# Patient Record
Sex: FEMALE | Race: WHITE
[De-identification: names, ages, dates, MRNs, and addresses within clinical notes are randomized per-mention and may not be internally consistent; named-entity substitution may affect disease eponyms.]

---

## 2017-06-10 ENCOUNTER — HOSPITAL ENCOUNTER (OUTPATIENT)
Dept: HOSPITAL 17 - PHED | Age: 52
Setting detail: OBSERVATION
LOS: 1 days | Discharge: HOME | End: 2017-06-11
Attending: HOSPITALIST | Admitting: HOSPITALIST
Payer: COMMERCIAL

## 2017-06-10 VITALS — DIASTOLIC BLOOD PRESSURE: 87 MMHG | SYSTOLIC BLOOD PRESSURE: 138 MMHG | HEART RATE: 78 BPM

## 2017-06-10 VITALS
RESPIRATION RATE: 16 BRPM | HEART RATE: 85 BPM | OXYGEN SATURATION: 99 % | SYSTOLIC BLOOD PRESSURE: 154 MMHG | DIASTOLIC BLOOD PRESSURE: 88 MMHG

## 2017-06-10 VITALS — RESPIRATION RATE: 16 BRPM | SYSTOLIC BLOOD PRESSURE: 158 MMHG | DIASTOLIC BLOOD PRESSURE: 63 MMHG | HEART RATE: 89 BPM

## 2017-06-10 VITALS — HEART RATE: 87 BPM | DIASTOLIC BLOOD PRESSURE: 73 MMHG | SYSTOLIC BLOOD PRESSURE: 147 MMHG

## 2017-06-10 VITALS — BODY MASS INDEX: 28.97 KG/M2 | WEIGHT: 157.41 LBS | HEIGHT: 62 IN

## 2017-06-10 VITALS — SYSTOLIC BLOOD PRESSURE: 167 MMHG | DIASTOLIC BLOOD PRESSURE: 85 MMHG | HEART RATE: 88 BPM

## 2017-06-10 VITALS
OXYGEN SATURATION: 99 % | HEART RATE: 74 BPM | SYSTOLIC BLOOD PRESSURE: 156 MMHG | DIASTOLIC BLOOD PRESSURE: 77 MMHG | RESPIRATION RATE: 16 BRPM

## 2017-06-10 VITALS
SYSTOLIC BLOOD PRESSURE: 144 MMHG | DIASTOLIC BLOOD PRESSURE: 86 MMHG | HEART RATE: 78 BPM | OXYGEN SATURATION: 98 % | RESPIRATION RATE: 16 BRPM

## 2017-06-10 VITALS
OXYGEN SATURATION: 99 % | RESPIRATION RATE: 16 BRPM | DIASTOLIC BLOOD PRESSURE: 94 MMHG | SYSTOLIC BLOOD PRESSURE: 189 MMHG | TEMPERATURE: 98.5 F | HEART RATE: 79 BPM

## 2017-06-10 VITALS — SYSTOLIC BLOOD PRESSURE: 152 MMHG | HEART RATE: 88 BPM | DIASTOLIC BLOOD PRESSURE: 89 MMHG

## 2017-06-10 DIAGNOSIS — R51: ICD-10-CM

## 2017-06-10 DIAGNOSIS — E27.9: ICD-10-CM

## 2017-06-10 DIAGNOSIS — Z88.5: ICD-10-CM

## 2017-06-10 DIAGNOSIS — Z88.2: ICD-10-CM

## 2017-06-10 DIAGNOSIS — R07.89: Primary | ICD-10-CM

## 2017-06-10 DIAGNOSIS — Z82.49: ICD-10-CM

## 2017-06-10 DIAGNOSIS — E89.0: ICD-10-CM

## 2017-06-10 DIAGNOSIS — E78.5: ICD-10-CM

## 2017-06-10 DIAGNOSIS — Z72.0: ICD-10-CM

## 2017-06-10 DIAGNOSIS — Z88.1: ICD-10-CM

## 2017-06-10 DIAGNOSIS — E78.00: ICD-10-CM

## 2017-06-10 DIAGNOSIS — I48.91: ICD-10-CM

## 2017-06-10 DIAGNOSIS — I10: ICD-10-CM

## 2017-06-10 LAB
ANION GAP SERPL CALC-SCNC: 9 MEQ/L (ref 5–15)
APTT BLD: 29.7 SEC (ref 24.3–30.1)
BASOPHILS # BLD AUTO: 0.1 TH/MM3 (ref 0–0.2)
BASOPHILS NFR BLD: 1.1 % (ref 0–2)
BUN SERPL-MCNC: 23 MG/DL (ref 7–18)
CHLORIDE SERPL-SCNC: 108 MEQ/L (ref 98–107)
CK SERPL-CCNC: 79 U/L (ref 26–192)
EOSINOPHIL # BLD: 0.2 TH/MM3 (ref 0–0.4)
EOSINOPHIL NFR BLD: 3.6 % (ref 0–4)
ERYTHROCYTE [DISTWIDTH] IN BLOOD BY AUTOMATED COUNT: 12.9 % (ref 11.6–17.2)
GFR SERPLBLD BASED ON 1.73 SQ M-ARVRAT: 58 ML/MIN (ref 89–?)
HCO3 BLD-SCNC: 28.4 MEQ/L (ref 21–32)
HCT VFR BLD CALC: 35.7 % (ref 35–46)
HEMO FLAGS: (no result)
INR PPP: 0.9 RATIO
LYMPHOCYTES # BLD AUTO: 1.9 TH/MM3 (ref 1–4.8)
LYMPHOCYTES NFR BLD AUTO: 29.7 % (ref 9–44)
MAGNESIUM SERPL-MCNC: 2.1 MG/DL (ref 1.5–2.5)
MCH RBC QN AUTO: 31.1 PG (ref 27–34)
MCHC RBC AUTO-ENTMCNC: 35 % (ref 32–36)
MCV RBC AUTO: 88.7 FL (ref 80–100)
MONOCYTES NFR BLD: 5.2 % (ref 0–8)
NEUTROPHILS # BLD AUTO: 4 TH/MM3 (ref 1.8–7.7)
NEUTROPHILS NFR BLD AUTO: 60.4 % (ref 16–70)
PLATELET # BLD: 120 TH/MM3 (ref 150–450)
POTASSIUM SERPL-SCNC: 3.8 MEQ/L (ref 3.5–5.1)
PROTHROMBIN TIME: 10.2 SEC (ref 9.8–11.6)
RBC # BLD AUTO: 4.02 MIL/MM3 (ref 4–5.3)
SODIUM SERPL-SCNC: 145 MEQ/L (ref 136–145)
WBC # BLD AUTO: 6.5 TH/MM3 (ref 4–11)

## 2017-06-10 PROCEDURE — 93017 CV STRESS TEST TRACING ONLY: CPT

## 2017-06-10 PROCEDURE — 82550 ASSAY OF CK (CPK): CPT

## 2017-06-10 PROCEDURE — G0378 HOSPITAL OBSERVATION PER HR: HCPCS

## 2017-06-10 PROCEDURE — 83690 ASSAY OF LIPASE: CPT

## 2017-06-10 PROCEDURE — 71275 CT ANGIOGRAPHY CHEST: CPT

## 2017-06-10 PROCEDURE — 71010: CPT

## 2017-06-10 PROCEDURE — 84484 ASSAY OF TROPONIN QUANT: CPT

## 2017-06-10 PROCEDURE — 74174 CTA ABD&PLVS W/CONTRAST: CPT

## 2017-06-10 PROCEDURE — 70450 CT HEAD/BRAIN W/O DYE: CPT

## 2017-06-10 PROCEDURE — 78452 HT MUSCLE IMAGE SPECT MULT: CPT

## 2017-06-10 PROCEDURE — 85610 PROTHROMBIN TIME: CPT

## 2017-06-10 PROCEDURE — 85025 COMPLETE CBC W/AUTO DIFF WBC: CPT

## 2017-06-10 PROCEDURE — 85730 THROMBOPLASTIN TIME PARTIAL: CPT

## 2017-06-10 PROCEDURE — 80048 BASIC METABOLIC PNL TOTAL CA: CPT

## 2017-06-10 PROCEDURE — 83735 ASSAY OF MAGNESIUM: CPT

## 2017-06-10 PROCEDURE — 99285 EMERGENCY DEPT VISIT HI MDM: CPT

## 2017-06-10 PROCEDURE — A9502 TC99M TETROFOSMIN: HCPCS

## 2017-06-10 PROCEDURE — 96374 THER/PROPH/DIAG INJ IV PUSH: CPT

## 2017-06-10 PROCEDURE — 93005 ELECTROCARDIOGRAM TRACING: CPT

## 2017-06-10 RX ADMIN — NITROGLYCERIN SCH MG: 0.4 TABLET SUBLINGUAL at 21:30

## 2017-06-10 RX ADMIN — NITROGLYCERIN SCH MG: 0.4 TABLET SUBLINGUAL at 21:22

## 2017-06-10 RX ADMIN — NITROGLYCERIN SCH MG: 0.4 TABLET SUBLINGUAL at 21:10

## 2017-06-10 RX ADMIN — PHENYTOIN SODIUM SCH MLS/HR: 50 INJECTION INTRAMUSCULAR; INTRAVENOUS at 21:21

## 2017-06-10 NOTE — RADHPO
EXAM DATE/TIME:  06/10/2017 23:09 

 

[facility] COMPARISON:     

No previous studies available for comparison.

 

 

INDICATIONS :     

Chest pain.  Short of breath. 

                           

 

IV CONTRAST:     

85 cc Omnipaque 350 (iohexol) IV 

 

 

RADIATION DOSE:     

19.02 CTDIvol (mGy) 

 

 

MEDICAL HISTORY :     

Emphysema. Diverticulitis. Hernia, hiatal. Hypertension.

 

SURGICAL HISTORY :      

None. 

 

ENCOUNTER:      

Initial

 

ACUITY:      

1 week

 

PAIN SCALE:      

8/10

 

LOCATION:        

chest 

 

TECHNIQUE:     

Volumetric scanning was performed using a multi-row detector CT scanner.  The data was post processed
 with a variety of visualization algorithms including full volume maximum intensity projection, multi
-planar sliding thin slab reformation, curved planar reformation, and surface rendering techniques.  
Using automated exposure control and adjustment of the mA and/or kV according to patient size, radiat
ion dose was kept as low as reasonably achievable to obtain optimal diagnostic quality images. 

 

FINDINGS:     

 

LUNGS:     

There is no consolidation or pneumothorax.  No concerning pulmonary nodule is visualized.  No pleural
 fluid is present.

 

MEDIASTINUM:     

No abnormally enlarged lymph nodes by CT criteria. No axillary or hilar abnormalities are identified.
 

 

ABDOMEN:     

The liver and spleen are free of focal defects. The gallbladder and pancreas demonstrate no abnormali
ty. The left adrenal gland is unremarkable. There is a 1.7 x 1.5 cm right adrenal mass most character
istic of an adenoma. The kidneys demonstrate no evidence of solid renal mass or hydronephrosis. No fr
ee fluid or abdominal masses are identified. No para-aortic adenopathy is seen.

 

PELVIS:     

No evidence of free fluid or pelvic mass. No abnormally enlarged inguinal or retroperitoneal lymph no
migue are present. The bladder is unremarkable.

 

THORACIC AORTA:       

The thoracic aortic root is normal with normal branching of the great vessels.  There is no evidence 
of aneurysm or dissection. 

 

ABDOMINAL AORTA:     

The aorta is normal in caliber without aneurysm or dissection.  The renal arteries are patent bilater
ally.  The proximal celiac and superior mesenteric arteries are patent and normal in diameter.   

 

PELVIC VESSELS:     

The internal iliac and external iliac vessels are patent without aneurysm or stenosis.

 

CONCLUSION:     

1. Small right adrenal mass most characteristic of an adenoma.

2. The thoracic aorta is unremarkable with no aneurysm or dissection.

 

 

 

 Liam Leyva MD on Nadia 10, 2017 at 23:48           

Board Certified Radiologist.

 This report was verified electronically.

## 2017-06-10 NOTE — PD
HPI


Chief Complaint:  Chest Pain


Time Seen by Provider:  20:58


Travel History


International Travel<30 days:  No


Contact w/Intl Traveler<30days:  No


Traveled to known affect area:  No





History of Present Illness


HPI


51-year-old female presents to the emergency department for complaint of chest 

pain.  Patient states she's had chest pain for the past several hours with 

radiation into her back associated shortness of breath nausea but no 

diaphoresis.  Patient states that she has been having symptoms like this over 

the past week and on Thursday she had an episode of lightheadedness and 

dizziness.  Patient also complains of headache and dizziness today.  Symptoms 

not sudden onset or thunderclap or worst ever.  Patient denies any visual 

disturbance.  No change in mentation no change in speech.  Patient states chest 

pain is 8/10 in intensity.  Patient denies abdominal pain.  No report of 

hematemesis coffee-ground emesis bilious emesis melena or hematochezia.  

Patient states she took 3 ibuprofen prior to arrival to the emergency 

department for did not take any aspirin.  Patient does have history of 

hypertension for which she is prescribed metoprolol as well as dyslipidemia and 

hypothyroidism.  Patient states 2012 she reportedly had a 'heart attack' that 

was diagnosed as pericarditis; did not undergo cardiac catheterization during 

her hospitalization or subsequently; and again was diagnosed with pericarditis.

  Patient does not have sublingual nitroglycerin to take on an as-needed basis.

  Patient denies any injury but had a near fall with dizziness and a "head rush

" while bathing her 4-year-old granddaughter on Thursday evening.  Patient 

states she did not suffer any injury.  She reports she was able to drive 

herself here to the hospital this evening.  Patient's primary is Dr. Walsh and 

her cardiologist is Dr. Castro.  Patient was a previous tobacco smoker.  Patient 

has family history of CAD.  Patient denies diabetes.





[facility]


Past Medical History


*** Narrative Medical


Hypertension atrial fibrillation pericarditis dyslipidemia chest pain 

diverticulitis vertigo hypothyroidism; prior tobacco use; family history CAD; 

nursing notes reviewed


Hx Anticoagulant Therapy:  No


Asthma:  No


Heart Rhythm Problems:  No


Cancer:  No


Cardiovascular Problems:  Yes (HTN, AFIB)


High Cholesterol:  Yes


Chemotherapy:  No


Chest Pain:  Yes


Congestive Heart Failure:  No


COPD:  No


Cerebrovascular Accident:  No


Diabetes:  No


Diverticulitis:  Yes


Endocrine:  Yes (HYPERTHYROIDISM)


Gastrointestinal Disorders:  Yes


GERD:  No


Genitourinary:  No


Hiatal Hernia:  Yes


Hypertension:  Yes


Immune Disorder:  No


Implanted Vascular Access Dvce:  No


Kidney Stones:  No


Musculoskeletal:  No


Neurologic:  No


Psychiatric:  No


Reproductive:  No


Respiratory:  No


Renal Failure:  No


Sleep Apnea:  No


Thyroid Disease:  Yes (THYROID ABLASION)


Ulcer:  No


Menopausal:  No





Past Surgical History


Abdominal Surgery:  No


Cardiac Surgery:  No


Ear Surgery:  No


Endocrine Surgery:  Yes (THYROID ABLASION)


Eye Surgery:  No


Genitourinary Surgery:  No


Gynecologic Surgery:  No


Hysterectomy:  No


Neurologic Surgery:  No


Oral Surgery:  No


Thoracic Surgery:  No


Other Surgery:  Yes ("thyroid burned off")





Social History


Alcohol Use:  Yes (rarely)


Tobacco Use:  Yes (quit 1 week ago)


Substance Use:  No





Allergies-Medications


(Allergen,Severity, Reaction):  


Coded Allergies:  


     Amoxicillin (Verified  Allergy, Severe, nausea, 6/10/17)


     Codeine (Verified  Allergy, Severe, nausea, 6/10/17)


     Sulfa (Verified  Allergy, Severe, hives, 6/10/17)


Reported Meds & Prescriptions





Reported Meds & Active Scripts


Active


Reported


Synthroid (Levothyroxine Sodium) 125 Mcg Tab 125 Mcg PO DAILY


Metoprolol Tartrate 25 Mg Tab 25 Mg PO BID


Lovastatin 20 Mg Tab 20 Mg PO DAILY








Review of Systems


Except as stated in HPI:  all other systems reviewed are Neg


General / Constitutional:  No: Fever, Chills


Eyes:  No: Diploplia, Blurred Vision


HENT:  Positive: Headaches,  No: Neck Stiffness, Neck Pain


Cardiovascular:  Positive: Chest Pain or Discomfort


Respiratory:  Positive: Shortness of Breath


Gastrointestinal:  Positive: Nausea,  No: Abdominal Pain


Genitourinary:  No: Flank Pain


Musculoskeletal:  No: Myalgias, Arthralgias, Pain


Skin:  No Rash


Neurologic:  Positive: Weakness, Dizziness, Headache,  No: Syncope, Focal 

Abnormalities, Coordination Problem, Tremor, Ataxia, Change in Mentation, 

Slurred Speech, Paresthesia, Incontinence, Seizures, Sensory Disturbance


Psychiatric:  Positive: Anxiety


Hematologic/Lymphatic:  No: Lymph Node Enlargement





Physical Exam


Narrative


GENERAL: Well-developed well-nourished female in acute distress no respiratory 

distress.


SKIN: Warm and dry.


HEAD: Atraumatic. Normocephalic. 


EYES: Pupils equal and round. No scleral icterus. No injection or drainage. 


ENT: No nasal bleeding or discharge.  Mucous membranes pink and moist.


NECK: Trachea midline. No JVD. 


CARDIOVASCULAR: Regular rate and rhythm.  Radial and dorsalis pedis pulses 2+ 

to palpation bilaterally.


RESPIRATORY: No accessory muscle use. Clear to auscultation. Breath sounds 

equal bilaterally. 


GASTROINTESTINAL: Abdomen soft, non-tender, nondistended. Hepatic and splenic 

margins not palpable. 


MUSCULOSKELETAL: Extremities without clubbing, cyanosis, or edema. No obvious 

deformities. 


NEUROLOGICAL: Awake and alert. No obvious cranial nerve deficits.  Motor 

grossly within normal limits. Five out of 5 muscle strength in the arms and 

legs.  Normal speech.


PSYCHIATRIC: Appropriate mood and affect; insight and judgment normal.





Data


Data


Last Documented VS





Vital Signs








  Date Time  Temp Pulse Resp B/P Pulse Ox O2 Delivery O2 Flow Rate FiO2


 


6/10/17 23:45  78 16 144/86 98 Room Air  


 


6/10/17 21:00       2 


 


6/10/17 20:50 98.5       








Orders





 Electrocardiogram (6/10/17 20:58)


Basic Metabolic Panel (Bmp) (6/10/17 20:58)


Ckmb (Isoenzyme) Profile (6/10/17 20:58)


Complete Blood Count With Diff (6/10/17 20:58)


Magnesium (Mg) (6/10/17 20:58)


Prothrombin Time / Inr (Pt) (6/10/17 20:58)


Act Partial Throm Time (Ptt) (6/10/17 20:58)


Troponin I (6/10/17 20:58)


Chest, Single Ap (6/10/17 20:58)


Ecg Monitoring (6/10/17 20:58)


Bilateral Bp Monitoring (6/10/17 20:58)


Iv Access Insert/Monitor (6/10/17 20:58)


Oximetry (6/10/17 20:58)


Oxygen Administration (6/10/17 20:58)


Aspirin Chew (Aspirin Chew) (6/10/17 21:00)


Sodium Chloride 0.9% Flush (Ns Flush) (6/10/17 21:00)


Nitroglycerin Sl (Nitrostat Sl) (6/10/17 21:00)


Ondansetron Inj (Zofran Inj) (6/10/17 21:00)


Sodium Chlor 0.9% 1000 Ml Inj (Ns 1000 M (6/10/17 21:00)


Morphine Inj (Morphine Inj) (6/10/17 21:45)


Morphine Inj (Morphine Inj) (6/10/17 22:15)


Ct Brain W/O Iv Contrast(Rout) (6/10/17 )


Cta Thor Abd Aorta W Iv C W3d (6/10/17 )


Iohexol 350 Inj (Omnipaque 350 Inj) (6/10/17 23:34)





Labs





 Laboratory Tests








Test 6/10/17





 21:00


 


White Blood Count 6.5 TH/MM3


 


Red Blood Count 4.02 MIL/MM3


 


Hemoglobin 12.5 GM/DL


 


Hematocrit 35.7 %


 


Mean Corpuscular Volume 88.7 FL


 


Mean Corpuscular Hemoglobin 31.1 PG


 


Mean Corpuscular Hemoglobin 35.0 %





Concent 


 


Red Cell Distribution Width 12.9 %


 


Platelet Count 120 TH/MM3


 


Mean Platelet Volume 9.5 FL


 


Neutrophils (%) (Auto) 60.4 %


 


Lymphocytes (%) (Auto) 29.7 %


 


Monocytes (%) (Auto) 5.2 %


 


Eosinophils (%) (Auto) 3.6 %


 


Basophils (%) (Auto) 1.1 %


 


Neutrophils # (Auto) 4.0 TH/MM3


 


Lymphocytes # (Auto) 1.9 TH/MM3


 


Monocytes # (Auto) 0.3 TH/MM3


 


Eosinophils # (Auto) 0.2 TH/MM3


 


Basophils # (Auto) 0.1 TH/MM3


 


CBC Comment DIFF FINAL 


 


Differential Comment  


 


Prothrombin Time 10.2 SEC


 


Prothromb Time International 0.9 RATIO





Ratio 


 


Activated Partial 29.7 SEC





Thromboplast Time 


 


Sodium Level 145 MEQ/L


 


Potassium Level 3.8 MEQ/L


 


Chloride Level 108 MEQ/L


 


Carbon Dioxide Level 28.4 MEQ/L


 


Anion Gap 9 MEQ/L


 


Blood Urea Nitrogen 23 MG/DL


 


Creatinine 1.00 MG/DL


 


Estimat Glomerular Filtration 58 ML/MIN





Rate 


 


Random Glucose 98 MG/DL


 


Calcium Level 9.3 MG/DL


 


Magnesium Level 2.1 MG/DL


 


Total Creatine Kinase 79 U/L


 


Troponin I LESS THAN 0.02





 NG/ML











MDM


Medical Decision Making


Medical Screen Exam Complete:  Yes


Emergency Medical Condition:  Yes


Medical Record Reviewed:  Yes


Interpretation(s)


EKG: Normal sinus rhythm rate 96 normal axis and intervals no acute ST 

elevation or injury pattern or ectopy noted


CXR: nad


CBC & BMP Diagram


6/10/17 21:00











 Vital Signs








  Date Time  Temp Pulse Resp B/P Pulse Ox O2 Delivery O2 Flow Rate FiO2


 


6/10/17 22:12  85 16 154/88 99 Room Air  


 


6/10/17 21:40  87  147/73    


 


6/10/17 21:35  88  152/89    


 


6/10/17 21:30  89  158/89    


 


6/10/17 21:30  78 16 158/89    





    169/63    


 


6/10/17 21:25  78  138/87    


 


6/10/17 21:20  88  167/85    


 


6/10/17 20:55  79 16  100 Room Air  


 


6/10/17 20:50 98.5 79 16 189/94 99   





Coagulation studies: Within normal limits


CK: 79, not elevated; troponin I less than 0.02, not elevated





CT brain w/o: 'negative' per reading radiologist Dr Phillips


CTA thoracic /abd aorta: CONCLUSION:     


1. Small right adrenal mass most characteristic of an adenoma.


2. The thoracic aorta is unremarkable with no aneurysm or dissection.


 


 


 


 Liam Leyva MD on Nadia 10, 2017 at 23:48           


Board Certified Radiologist.


 This report was verified electronically.  








Last Impressions








Chest X-Ray 6/10/17 2058 Signed





Impressions: 





 Service Date/Time:  Saturday, Nadia 10, 2017 21:16 - CONCLUSION:  No acute 





 disease.  No significant change has occurred.       Anil Carney MD 


 


Head CT 6/10/17 0000 Signed





Impressions: 





 Service Date/Time:  Saturday, Nadia 10, 2017 23:02 - CONCLUSION: Negative 





 noncontrast CT.     Liam Leyva MD 








Differential Diagnosis


Chest pain, ACS, MI, arrhythmia, aortic dissection, aneurysm, TIA, CVA, vertigo

, labyrinthitis, dehydration, electrolyte disturbance, biliary colic, 

pancreatitis, esophageal spasm; also to consider PE


Narrative Course


Patient placed on cardiac monitor EKG performed which is sinus rhythm without 

ST elevation or injury pattern or ectopy noted; patient administered aspirin 

162 mg by mouth and sublingual nitroglycerin as well as maintenance fluids 

normal saline 100 cc per hour and Zofran 4 mg IV for complaint of nausea


Patient administered morphine sulfate 3 mg IV after sublingual nitroglycerin 

stating that pressure is still persistent and headache has now developed after 

nitroglycerin


Patient notes discomfort to have decreased to 4 per 10 in intensity.  Improved 

continues to complain of persistent pressure radiating into the back; CK and 

troponin values are pending; patient is aware of plan to admit at least for 

chest pain center protocol due to risk factor however in view of elevated blood 

pressure chest pain that radiates into her back and causes shortness of breath 

and nausea without any abdominal pain and normal lab values will proceed with 

CT a of the thorax and abdomen patient continues complaining of headache and 

complaint of dizziness and balance disturbance we'll obtain a CT brain 

noncontrast prior to imaging with contrast.


Cardiac enzymes are found to be in normal range





Physician Communication


Physician Communication


call placed to [facility] service for [facility] OBS





Diagnosis





 Primary Impression:  


 Chest pain


 Additional Impression:  


 Hypertension





Admitting Information


Admitting Physician Requests:  Observation








Luana Palmer MD Chucho 10, 2017 21:07

## 2017-06-10 NOTE — RADHPO
EXAM DATE/TIME:  06/10/2017 23:02 

 

[facility] COMPARISON:     

CT BRAIN W/O CONTRAST, December 13, 2015, 19:58.

 

 

INDICATIONS :     

Cephalgia. 

                      

 

RADIATION DOSE:     

65.12 CTDIvol (mGy) 

 

 

 

MEDICAL HISTORY :     

Emphysema. Diverticulitis. Hernia, hiatal. Hypertension.

 

SURGICAL HISTORY :      

None. 

 

ENCOUNTER:      

Initial

 

ACUITY:      

1 day

 

PAIN SCALE:      

7/10

 

LOCATION:        

cranial 

 

TECHNIQUE:     

Multiple contiguous axial images were obtained of the head.  Using automated exposure control and adj
ustment of the mA and/or kV according to patient size, radiation dose was kept as low as reasonably a
chievable to obtain optimal diagnostic quality images. 

 

FINDINGS:     

 

CEREBRUM:     

The ventricles are normal for age.  No evidence of midline shift, mass lesion, hemorrhage or acute in
farction.  No extra-axial fluid collections are seen.

 

POSTERIOR FOSSA:     

The cerebellum and brainstem are intact.  The 4th ventricle is midline.  The cerebellopontine angle i
s unremarkable.

 

EXTRACRANIAL:     

The visualized portion of the orbits is intact.

 

SKULL:     

The calvaria is intact.  No evidence of skull fracture.

 

CONCLUSION:     Negative noncontrast CT. 

 

 

 Liam Leyva MD on Nadia 10, 2017 at 23:35           

Board Certified Radiologist.

 This report was verified electronically.

## 2017-06-10 NOTE — RADHPO
EXAM DATE/TIME:  06/10/2017 21:16 

 

[facility] COMPARISON:     

CHEST SINGLE AP, November 03, 2015, 15:04.

 

                     

INDICATIONS :     

Chest pain. 

                     

 

MEDICAL HISTORY :     

Hypertension.  Myocardial infarction.        

 

SURGICAL HISTORY :     

None.   

 

ENCOUNTER:     

Initial                                        

 

ACUITY:     

1 day      

 

PAIN SCORE:     

5/10

 

LOCATION:     

Bilateral chest 

 

FINDINGS:     

A single view of the chest demonstrates the lungs to be symmetrically aerated without evidence of mas
s, infiltrate or effusion.  The cardiomediastinal contours are unremarkable.  Osseous structures are 
intact.

 

CONCLUSION:     

No acute disease.  No significant change has occurred.  

 

 

 

 Anil Carney MD on Nadia 10, 2017 at 21:31           

Board Certified Radiologist.

 This report was verified electronically.

## 2017-06-11 VITALS
SYSTOLIC BLOOD PRESSURE: 141 MMHG | DIASTOLIC BLOOD PRESSURE: 81 MMHG | RESPIRATION RATE: 16 BRPM | OXYGEN SATURATION: 98 % | HEART RATE: 63 BPM | TEMPERATURE: 97.1 F

## 2017-06-11 VITALS
SYSTOLIC BLOOD PRESSURE: 122 MMHG | TEMPERATURE: 97.3 F | HEART RATE: 70 BPM | DIASTOLIC BLOOD PRESSURE: 71 MMHG | RESPIRATION RATE: 18 BRPM | OXYGEN SATURATION: 96 %

## 2017-06-11 VITALS
DIASTOLIC BLOOD PRESSURE: 90 MMHG | RESPIRATION RATE: 18 BRPM | TEMPERATURE: 96 F | OXYGEN SATURATION: 97 % | HEART RATE: 60 BPM | SYSTOLIC BLOOD PRESSURE: 154 MMHG

## 2017-06-11 VITALS — HEART RATE: 66 BPM

## 2017-06-11 VITALS
HEART RATE: 55 BPM | DIASTOLIC BLOOD PRESSURE: 78 MMHG | OXYGEN SATURATION: 96 % | TEMPERATURE: 97 F | RESPIRATION RATE: 18 BRPM | SYSTOLIC BLOOD PRESSURE: 132 MMHG

## 2017-06-11 VITALS — OXYGEN SATURATION: 97 %

## 2017-06-11 VITALS — OXYGEN SATURATION: 94 %

## 2017-06-11 LAB
CK SERPL-CCNC: 55 U/L (ref 26–192)
CK SERPL-CCNC: 61 U/L (ref 26–192)

## 2017-06-11 RX ADMIN — PHENYTOIN SODIUM SCH MLS/HR: 50 INJECTION INTRAMUSCULAR; INTRAVENOUS at 06:02

## 2017-06-11 NOTE — EKG
Date Performed: 06/11/2017       Time Performed: 00:58:52

 

PTAGE:      51 years

 

EKG:      Sinus bradycardia Lead(s) unsuitable for analysis: V2 Low QRS voltage Borderline ECG

 

PREVIOUS TRACING       : 06/10/2017 20.52

 

DOCTOR:   Yehuda Farmer  Interpretating Date/Time  06/11/2017 12:30:31

## 2017-06-11 NOTE — RADHPO
EXAM DATE/TIME:  06/11/2017 11:51 

 

[facility] COMPARISON:     

MYOCARDIAL PERF PHARM SPECT, GATED W/EF, November 04, 2015, 11:18.

 

 

INDICATIONS :     

Chest pain radiating to the back for months. Angina. Atrial fibrillation.

                           

 

DOSE:     

25.6 mCi Tc99m Myoview at stress.

                     8.1 mCi Tc99m Myoview at rest.

                     0.4 mg Lexiscan

                       

 

 

STRESS SYMPTOMS:      

Stomach cramps.

                       

 

 

EJECTION FRACTION:      

> 70%

                       

 

MEDICAL HISTORY :     

Hypercholesterolemia. Hypertension. Hypothyroidism. Palpitations.

 

SURGICAL HISTORY :       

None.  

 

ENCOUNTER:     

Initial

 

ACUITY:     

3 months

 

PAIN SCALE:     

3/10

 

LOCATION:      

Bilateral chest 

 

TECHNIQUE:     

The patient underwent pharmacologic stress with infusion of prescribed dose.  Continuous ECG tracing 
was monitored during stress.  Gated SPECT imaging was performed after stress and conventional SPECT i
maging was performed at rest.  The examination was performed on a SPECT/CT scanner, both attenuation 
and non-corrected datasets were reviewed.

 

FINDINGS:     

 

DISTRIBUTION:     

The maximum perfused segment at stress is in the anterior wall.

 

PERFUSION STUDY:     

The pattern of perfusion at stress is within normal limits.

 

GATED STUDY:     

There is intact wall motion and thickening without hypokinetic or dyskinetic segments. 

 

CONCLUSION:     Within normal limits. No evidence of stress-induced ischemia.

 

RISK CATEGORY:     Low

 

 

 

 Waylon Fofana MD on June 11, 2017 at 13:11           

Board Certified Radiologist.

 This report was verified electronically.

## 2017-06-11 NOTE — EKG
Date Performed: 06/11/2017       Time Performed: 03:14:10

 

PTAGE:      51 years

 

EKG:      Sinus rhythm 

 

 Low QRS voltages in precordial leads Borderline ECG

 

PREVIOUS TRACING       : 06/10/2017 20.52

 

DOCTOR:   Yehuda Farmer  Interpretating Date/Time  06/11/2017 12:34:24

## 2017-06-11 NOTE — HHI.HP
__________________________________________________





[facility]


Service


[facility]


Primary Care Physician


Unknown


Admission Diagnosis


chest pain


Diagnoses:  


(1) Chest pain


Diagnosis:  Principal





(2) Hypertension


Diagnosis:  Secondary





(3) Hypothyroidism following radioiodine therapy


Diagnosis:  Secondary





(4) Tobacco use


Diagnosis:  Secondary





Chief Complaint:  


Chest pain


Travel History


International Travel<30 Days:  No


Contact w/Intl Traveler <30 Da:  No


Traveled to Known Affected Are:  No


History of Present Illness


Written by Alberto Kinney, acting as scribe for Dr. Cardona on 6/11/17 at 08:

01. 





51-year-old  female with known history of hypertension, hyperlipidemia

, history of atrial fibrillation, hypothyroidism secondary to thyroid ablation 

presented because of multiple symptoms to include indigestion, chest discomfort

, headache, lightheadedness and dizziness.  Patient states that her symptoms 

are going on for quite some time over last few months.  She has noticed that 

she is had increased indigestion symptoms on a daily basis with intermittent 

chest pressure which she describes as a 5/10 on a pain scale.  She indicates 

that the pain/pressure radiates into her back.  There is no radiation to neck, 

shoulder, arms.  She has had mild nausea but no vomiting.  Denies any 

diaphoresis.  She has had episodes of lightheadedness and dizziness in which 

she associates with what she describes as palpitations.  She states that she 

does intermittently feel her heart race for short period and then it will self 

resolved.  She does have a cardiologist Dr. Castro, that she follows on a regular 

basis.  Yesterday patient had severe headache which she describes as a 

migraine.  That resolved after given morphine in the emergency department.  She 

does have mild headache at this time but not the same as she did yesterday, 

which she contributes to nitroglycerin.  At the present time she is still 

experiencing indigestion type symptoms.  Does not have the pressure sensation.  

Patient has had previous cardiac workup 2 years ago and chest pain center and 

had a nuclear stress test done which was negative for any ischemia.  The 

patient did have CT the thorax, CT of the head performed emergency department 

which were both unremarkable.  Basic workup in emergency department was 

unremarkable.  It was recommended by ER physician that the patient be observed 

in the chest pain center for further evaluation management.





Review of Systems


Constitutional:  COMPLAINS OF: Dizziness,  DENIES: Diaphoretic episodes, Fatigue

, Fever, Weight gain, Weight loss, Chills, Change in appetite, Night Sweats


Eyes:  DENIES: Blurred vision, Diplopia, Eye inflammation, Eye pain, Vision loss

, Photosensitivity, Double Vision


Ears, nose, mouth, throat:  DENIES: Vertigo, Nasal discharge, Throat pain, Ear 

Pain, Running Nose, Sinus Pain


Respiratory:  DENIES: Apneas, Cough, Snoring, Wheezing, Hemoptysis, Sputum 

production, Shortness of breath


Cardiovascular:  COMPLAINS OF: Chest pain, Palpitations,  DENIES: Syncope, 

Dyspnea on Exertion, Lower Extremity Edema, Orthopnea


Gastrointestinal:  DENIES: Abdominal pain, Black stools, Bloody stools, 

Constipation, Diarrhea, Nausea, Vomiting, Difficulty Swallowing, Anorexia


Neurologic:  COMPLAINS OF: Headache,  DENIES: Abnormal gait, Localized weakness

, Paresthesias, Seizures, Speech Problems, Tremor, Poor Balance





Past Family Social History


Past Medical History


Hypertension


Hyperlipidemia


Emphysema


Chronic tobacco use


Thyroid ablation now hypothyroidism


History of pericarditis


History diverticulosis


History of atrial fibrillation


Past Surgical History


No previous surgeries


Reported Medications





Last Impressions








Chest X-Ray 6/10/17 2058 Signed





Impressions: 





 Service Date/Time:  Saturday, Nadia 10, 2017 21:16 - CONCLUSION:  No acute 





 disease.  No significant change has occurred.       Anil Carney MD 


 


Head CT 6/10/17 0000 Signed





Impressions: 





 Service Date/Time:  Saturday, Nadia 10, 2017 23:02 - CONCLUSION: Negative 





 noncontrast CT.     Liam Leyva MD 


 


Aorta CTA 6/10/17 0000 Signed





Impressions: 





 Service Date/Time:  Saturday, Nadia 10, 2017 23:09 - CONCLUSION:  1. Small 

right 





 adrenal mass most characteristic of an adenoma. 2. The thoracic aorta is 





 unremarkable with no aneurysm or dissection.     Liam Leyva MD 








Allergies:  


Coded Allergies:  


     Amoxicillin (Verified  Allergy, Severe, nausea, 6/10/17)


     Codeine (Verified  Allergy, Severe, nausea, 6/10/17)


     Sulfa (Verified  Allergy, Severe, hives, 6/10/17)


Family History


Family history of heart disease, diabetes, stroke.  No history of cancer, 

seizures


Social History


Patient quit smoking 1 month ago, prior to that she smoked one third pack a 

cigarettes a day since she was 16 years old.  Denies any alcohol or illicit drug





Physical Exam


Vital Signs





 Vital Signs








  Date Time  Temp Pulse Resp B/P Pulse Ox O2 Delivery O2 Flow Rate FiO2


 


6/11/17 04:25 97.1 63 16 141/81 98   


 


6/11/17 02:21     97   21


 


6/11/17 01:48  66      


 


6/11/17 01:45 96.0 60 18 154/90 97   


 


6/10/17 23:45  78 16 144/86 98 Room Air  


 


6/10/17 23:05  74 16 156/77 99   


 


6/10/17 22:12  85 16 154/88 99 Room Air  


 


6/10/17 21:40  87  147/73    


 


6/10/17 21:35  88  152/89    


 


6/10/17 21:30  89  158/89    


 


6/10/17 21:30  78 16 158/89    





    169/63    


 


6/10/17 21:25  78  138/87    


 


6/10/17 21:20  88  167/85    


 


6/10/17 21:00     99 Nasal Cannula 2 


 


6/10/17 20:55  79 16  100 Room Air  


 


6/10/17 20:50 98.5 79 16 189/94 99   








Physical Exam


GENERAL: Well-developed, well-nourished, in no acute distress. alert and 

orientated


HEENT: Head is normocephalic without any lesions or masses noted. Facial 

features are symmetric. Eyes: Pupils equal round reactive to light. Extraocular 

muscles are intact. Conjunctivae were clear. Oropharyngeal: Pharynx without any 

erythema edema. Tongue is midline without deviation. Buccal mucosa is moist 

without any masses or lesions


NECK: Supple without any masses. Trachea midline no deviation. No JVD, no 

bruits are appreciated


CARDIAC: Regular rhythm, regular rate. S1/S2 are heard. No murmurs gallops or 

rubs.


LUNGS: Clear to auscultation bilaterally. No wheeze, rhonchi or rales. No use 

of accessory muscles on inspiration or expiration.


ABDOMEN: Soft, nontender. Nondistended. Bowel sounds heard in all 4 quadrants. 

No organomegaly or masses. Negative rebound, negative guarding


EXTREMITIES: No edema, pulses are equal bilaterally. No cyanosis or clubbing


NEUROLOGY: Mood and affect appear appropriate. Cranial nerves II through XII 

grossly intact. Muscle strength 5/5 in upper and lower extremities bilaterally. 

Deep tendon reflexes are 2+ in upper and lower extremities bilaterally.


Laboratory





Laboratory Tests








Test 6/10/17 6/11/17 6/11/17





 21:00 00:40 03:29


 


White Blood Count 6.5   


 


Red Blood Count 4.02   


 


Hemoglobin 12.5   


 


Hematocrit 35.7   


 


Mean Corpuscular Volume 88.7   


 


Mean Corpuscular Hemoglobin 31.1   


 


Mean Corpuscular Hemoglobin 35.0   





Concent   


 


Red Cell Distribution Width 12.9   


 


Platelet Count 120   


 


Mean Platelet Volume 9.5   


 


Neutrophils (%) (Auto) 60.4   


 


Lymphocytes (%) (Auto) 29.7   


 


Monocytes (%) (Auto) 5.2   


 


Eosinophils (%) (Auto) 3.6   


 


Basophils (%) (Auto) 1.1   


 


Neutrophils # (Auto) 4.0   


 


Lymphocytes # (Auto) 1.9   


 


Monocytes # (Auto) 0.3   


 


Eosinophils # (Auto) 0.2   


 


Basophils # (Auto) 0.1   


 


CBC Comment DIFF FINAL   


 


Differential Comment    


 


Prothrombin Time 10.2   


 


Prothromb Time International 0.9   





Ratio   


 


Activated Partial 29.7   





Thromboplast Time   


 


Sodium Level 145   


 


Potassium Level 3.8   


 


Chloride Level 108   


 


Carbon Dioxide Level 28.4   


 


Anion Gap 9   


 


Blood Urea Nitrogen 23   


 


Creatinine 1.00   


 


Estimat Glomerular Filtration 58   





Rate   


 


Random Glucose 98   


 


Calcium Level 9.3   


 


Magnesium Level 2.1   


 


Total Creatine Kinase 79  61  55 


 


Troponin I LESS THAN 0.02  LESS THAN 0.02  LESS THAN 0.02 








Result Diagram:  


6/10/17 2100                                                                   

             6/10/17 2100





Imaging





Last Impressions








Chest X-Ray 6/10/17 2058 Signed





Impressions: 





 Service Date/Time:  Saturday, Nadia 10, 2017 21:16 - CONCLUSION:  No acute 





 disease.  No significant change has occurred.       Anil Carney MD 


 


Head CT 6/10/17 0000 Signed





Impressions: 





 Service Date/Time:  Saturday, Nadia 10, 2017 23:02 - CONCLUSION: Negative 





 noncontrast CT.     Liam Leyva MD 


 


Aorta CTA 6/10/17 0000 Signed





Impressions: 





 Service Date/Time:  Saturday, Nadia 10, 2017 23:09 - CONCLUSION:  1. Small 

right 





 adrenal mass most characteristic of an adenoma. 2. The thoracic aorta is 





 unremarkable with no aneurysm or dissection.     Liam Leyva MD 











Assessment and Plan


Assessment and Plan


Chest pain, atypical


Patient with increased risk factors to include age, hypertension, hyponatremia

, family history of heart disease, tobacco use


Patient has been ruled out for acute coronary event with serial cardiac 

enzymes which are negative, serial EKGs shows sinus rhythm without any changes


CT of the thorax was performed which did not indicate any acute abnormality or 

aortic aneurysm or dissection


We'll check lipase level considering patient is having epigastric discomfort 

with radiation into the back


We'll pursue nuclear stress test rule out any underlying ischemia--myocardial 

perfusion performed and low risk


Patient continued on aspirin


Pain control with Tylenol, morphine, nitroglycerin





Accelerated hypertension


Blood pressure improved at this time


Continue home medications





Cephalgia with lightheadedness and dizziness


CT scan of the brain did not indicate any acute abnormality





History of atrial fibrillation, likely paroxysmal


EKG shows sinus rhythm at this time


Continue metoprolol


CHADS 1, Continue aspirin





Hyperlipidemia


Statin continued





Hypothyroidism


Resume replacement therapy





incidental finding of small right adrenal mass.  Patient will need to follow-up 

with primary carego over results, decide on whether to monitor..  Patient 

conveys understanding.





DVT prevention


Low risk, early ambulation








Discharge disposition





Discharge home in stable condition





Activity: Ad javier.





Diet: Healthy heart diet





Medications per medication reconciliation





Follow-up with primary medical doctor in one week, cardiologist in 2 weeks





This note was transcribed by scribe [Alberto Kinney].  I, Dr. Fred Cardona personally performed the history, physical exam, and medical decision 

making; and confirmed the accuracy of the information in the transcribed note.





Authenticated by Dr. Fred Cardona on 6/12/17 at 00:15.





Problem Qualifiers





(1) Chest pain:  


Qualified Code:  R07.9 - Chest pain, unspecified type


(2) Hypertension:  


Qualified Code:  I15.9 - Secondary hypertension





Alberto Kinney Jun 11, 2017 07:47


Fred Cardona MD Jun 12, 2017 00:15

## 2017-06-11 NOTE — TR
Date Performed: 06/11/2017       Time Performed: 12:22:57

 

DOCTOR:      Yehuda Farmer 

 

DRUG LIST:      VASOTEC Metoprolol

CLINICAL HISTORY:      CHEST PAIN

REASON FOR TEST:     

REASON FOR ENDING:     

OBSERVATION:     

CONCLUSION:      Lexiscan stress test was performed under standard four minute protocol. Radionuclide
 was injected one minute prior to ending the test. The patient was asymptomatic. No electrocardiograp
hic abnormalities were present to suggest ischemia. Recovery was quick and uneventful. Nuclear imagin
g and interpretation are pending.

COMMENTS:

## 2018-05-18 ENCOUNTER — HOSPITAL ENCOUNTER (EMERGENCY)
Age: 53
Discharge: HOME | End: 2018-05-18

## 2018-05-18 DIAGNOSIS — R20.2: ICD-10-CM

## 2018-05-18 DIAGNOSIS — Z87.891: ICD-10-CM

## 2018-05-18 DIAGNOSIS — E78.00: ICD-10-CM

## 2018-05-18 DIAGNOSIS — E07.9: ICD-10-CM

## 2018-05-18 DIAGNOSIS — I10: ICD-10-CM

## 2018-05-18 DIAGNOSIS — M79.671: Primary | ICD-10-CM
